# Patient Record
Sex: MALE | Race: WHITE | Employment: FULL TIME | URBAN - METROPOLITAN AREA
[De-identification: names, ages, dates, MRNs, and addresses within clinical notes are randomized per-mention and may not be internally consistent; named-entity substitution may affect disease eponyms.]

---

## 2019-05-08 NOTE — PROGRESS NOTES
PT aware of NPO status. NPO after MN night prior to procedure  PT aware of need to hold anticoagulants per protocol. Pt aware to withhold meloxicam x 10 days prior to procedure. PT aware of potential for sedation administration and need for  at discharge. PT aware of arrival time pre procedure. Arrive at [facility] radiology waiting room on 5/21/19 at 0700 for scheduled procedure to occur at 0830. Pt states no questions at this time. Gave pt [facility] radiology rn phone number [phone].

## 2019-05-21 ENCOUNTER — HOSPITAL ENCOUNTER (OUTPATIENT)
Dept: ULTRASOUND IMAGING | Age: 49
Discharge: HOME | End: 2019-05-21
Attending: RADIOLOGY | Admitting: RADIOLOGY
Payer: COMMERCIAL

## 2019-05-21 VITALS
DIASTOLIC BLOOD PRESSURE: 74 MMHG | HEART RATE: 71 BPM | BODY MASS INDEX: 27.5 KG/M2 | OXYGEN SATURATION: 95 % | TEMPERATURE: 97.8 F | SYSTOLIC BLOOD PRESSURE: 115 MMHG | RESPIRATION RATE: 16 BRPM | WEIGHT: 203 LBS | HEIGHT: 72 IN

## 2019-05-21 DIAGNOSIS — R74.02 NONSPECIFIC ELEVATION OF LEVELS OF TRANSAMINASE OR LACTIC ACID DEHYDROGENASE (LDH): ICD-10-CM

## 2019-05-21 DIAGNOSIS — K70.0 ALCOHOLIC FATTY LIVER: ICD-10-CM

## 2019-05-21 DIAGNOSIS — R74.01 NONSPECIFIC ELEVATION OF LEVELS OF TRANSAMINASE OR LACTIC ACID DEHYDROGENASE (LDH): ICD-10-CM

## 2019-05-21 DIAGNOSIS — E83.119 HEMOCHROMATOSIS: ICD-10-CM

## 2019-05-21 LAB
ANION GAP SERPL CALC-SCNC: 11 MMOL/L (ref 3–18)
APTT PPP: 29.7 SEC (ref 23–36.4)
BASOPHILS # BLD: 0.1 K/UL (ref 0–0.1)
BASOPHILS NFR BLD: 1 % (ref 0–2)
BUN SERPL-MCNC: 14 MG/DL (ref 7–18)
BUN/CREAT SERPL: 15 (ref 12–20)
CALCIUM SERPL-MCNC: 9.2 MG/DL (ref 8.5–10.1)
CHLORIDE SERPL-SCNC: 101 MMOL/L (ref 100–108)
CO2 SERPL-SCNC: 26 MMOL/L (ref 21–32)
CREAT SERPL-MCNC: 0.95 MG/DL (ref 0.6–1.3)
DIFFERENTIAL METHOD BLD: ABNORMAL
EOSINOPHIL # BLD: 0.3 K/UL (ref 0–0.4)
EOSINOPHIL NFR BLD: 3 % (ref 0–5)
ERYTHROCYTE [DISTWIDTH] IN BLOOD BY AUTOMATED COUNT: 12.4 % (ref 11.6–14.5)
GLUCOSE SERPL-MCNC: 102 MG/DL (ref 74–99)
HCT VFR BLD AUTO: 50.1 % (ref 36–48)
HGB BLD-MCNC: 17.7 G/DL (ref 13–16)
INR PPP: 1 (ref 0.8–1.2)
LYMPHOCYTES # BLD: 2.5 K/UL (ref 0.9–3.6)
LYMPHOCYTES NFR BLD: 26 % (ref 21–52)
MCH RBC QN AUTO: 34.8 PG (ref 24–34)
MCHC RBC AUTO-ENTMCNC: 35.3 G/DL (ref 31–37)
MCV RBC AUTO: 98.4 FL (ref 74–97)
MONOCYTES # BLD: 1.3 K/UL (ref 0.05–1.2)
MONOCYTES NFR BLD: 13 % (ref 3–10)
NEUTS SEG # BLD: 5.6 K/UL (ref 1.8–8)
NEUTS SEG NFR BLD: 57 % (ref 40–73)
PLATELET # BLD AUTO: 271 K/UL (ref 135–420)
PMV BLD AUTO: 9.3 FL (ref 9.2–11.8)
POTASSIUM SERPL-SCNC: 4.3 MMOL/L (ref 3.5–5.5)
PROTHROMBIN TIME: 12.6 SEC (ref 11.5–15.2)
RBC # BLD AUTO: 5.09 M/UL (ref 4.7–5.5)
SODIUM SERPL-SCNC: 138 MMOL/L (ref 136–145)
WBC # BLD AUTO: 9.8 K/UL (ref 4.6–13.2)

## 2019-05-21 PROCEDURE — 85610 PROTHROMBIN TIME: CPT

## 2019-05-21 PROCEDURE — 88313 SPECIAL STAINS GROUP 2: CPT

## 2019-05-21 PROCEDURE — 85025 COMPLETE CBC W/AUTO DIFF WBC: CPT

## 2019-05-21 PROCEDURE — 47000 NEEDLE BIOPSY OF LIVER PERQ: CPT

## 2019-05-21 PROCEDURE — 85730 THROMBOPLASTIN TIME PARTIAL: CPT

## 2019-05-21 PROCEDURE — 74011250636 HC RX REV CODE- 250/636

## 2019-05-21 PROCEDURE — 80048 BASIC METABOLIC PNL TOTAL CA: CPT

## 2019-05-21 PROCEDURE — 88307 TISSUE EXAM BY PATHOLOGIST: CPT

## 2019-05-21 PROCEDURE — 74011250636 HC RX REV CODE- 250/636: Performed by: RADIOLOGY

## 2019-05-21 RX ORDER — FLUMAZENIL 0.1 MG/ML
0.2 INJECTION INTRAVENOUS
Status: DISCONTINUED | OUTPATIENT
Start: 2019-05-21 | End: 2019-05-21 | Stop reason: HOSPADM

## 2019-05-21 RX ORDER — MIDAZOLAM HYDROCHLORIDE 1 MG/ML
.5-4 INJECTION, SOLUTION INTRAMUSCULAR; INTRAVENOUS
Status: DISCONTINUED | OUTPATIENT
Start: 2019-05-21 | End: 2019-05-21 | Stop reason: HOSPADM

## 2019-05-21 RX ORDER — LIDOCAINE HYDROCHLORIDE 10 MG/ML
10 INJECTION, SOLUTION EPIDURAL; INFILTRATION; INTRACAUDAL; PERINEURAL
Status: COMPLETED | OUTPATIENT
Start: 2019-05-21 | End: 2019-05-21

## 2019-05-21 RX ORDER — SODIUM CHLORIDE 9 MG/ML
25 INJECTION, SOLUTION INTRAVENOUS CONTINUOUS
Status: DISCONTINUED | OUTPATIENT
Start: 2019-05-21 | End: 2019-05-21 | Stop reason: HOSPADM

## 2019-05-21 RX ORDER — FENTANYL CITRATE 50 UG/ML
INJECTION, SOLUTION INTRAMUSCULAR; INTRAVENOUS
Status: COMPLETED
Start: 2019-05-21 | End: 2019-05-21

## 2019-05-21 RX ORDER — LIDOCAINE HYDROCHLORIDE 10 MG/ML
INJECTION, SOLUTION EPIDURAL; INFILTRATION; INTRACAUDAL; PERINEURAL
Status: COMPLETED
Start: 2019-05-21 | End: 2019-05-21

## 2019-05-21 RX ORDER — NALOXONE HYDROCHLORIDE 0.4 MG/ML
0.4 INJECTION, SOLUTION INTRAMUSCULAR; INTRAVENOUS; SUBCUTANEOUS AS NEEDED
Status: DISCONTINUED | OUTPATIENT
Start: 2019-05-21 | End: 2019-05-21 | Stop reason: HOSPADM

## 2019-05-21 RX ORDER — MIDAZOLAM HYDROCHLORIDE 1 MG/ML
INJECTION, SOLUTION INTRAMUSCULAR; INTRAVENOUS
Status: COMPLETED
Start: 2019-05-21 | End: 2019-05-21

## 2019-05-21 RX ORDER — FENTANYL CITRATE 50 UG/ML
25-200 INJECTION, SOLUTION INTRAMUSCULAR; INTRAVENOUS
Status: DISCONTINUED | OUTPATIENT
Start: 2019-05-21 | End: 2019-05-21 | Stop reason: HOSPADM

## 2019-05-21 RX ADMIN — FENTANYL CITRATE 25 MCG: 50 INJECTION, SOLUTION INTRAMUSCULAR; INTRAVENOUS at 08:56

## 2019-05-21 RX ADMIN — SODIUM CHLORIDE 25 ML/HR: 900 INJECTION, SOLUTION INTRAVENOUS at 07:17

## 2019-05-21 RX ADMIN — MIDAZOLAM HYDROCHLORIDE 1 MG: 1 INJECTION, SOLUTION INTRAMUSCULAR; INTRAVENOUS at 08:50

## 2019-05-21 RX ADMIN — FENTANYL CITRATE 25 MCG: 50 INJECTION, SOLUTION INTRAMUSCULAR; INTRAVENOUS at 08:53

## 2019-05-21 RX ADMIN — FENTANYL CITRATE 50 MCG: 50 INJECTION, SOLUTION INTRAMUSCULAR; INTRAVENOUS at 08:50

## 2019-05-21 RX ADMIN — LIDOCAINE HYDROCHLORIDE 4 ML: 10 INJECTION, SOLUTION EPIDURAL; INFILTRATION; INTRACAUDAL; PERINEURAL at 09:02

## 2019-05-21 RX ADMIN — MIDAZOLAM HYDROCHLORIDE 0.5 MG: 1 INJECTION, SOLUTION INTRAMUSCULAR; INTRAVENOUS at 08:53

## 2019-05-21 RX ADMIN — MIDAZOLAM HYDROCHLORIDE 0.5 MG: 1 INJECTION, SOLUTION INTRAMUSCULAR; INTRAVENOUS at 08:56

## 2019-05-21 NOTE — PROGRESS NOTES
Back from procedure. Laying on right side, tegaderm & gauze to right side of abdomen. No bleeding or swelling noted. denies pain, drink given. 1000 Sleeping,  1130 Tolerated diet well,.  8198 Ambulated to bathroom voided. Discharge instructions reviewed. Verbalized understanding. 1330 Discharged home via w/c in care of friend in stable condition. Denies pain, dressing intact to right abdomen.

## 2019-05-21 NOTE — PROCEDURES
Vascular & Interventional Radiology Brief Procedure Note    Interventional Radiologist: Richa Garrido MD    Pre-operative Diagnosis:  Liver dz    Post-operative Diagnosis: Same as pre-op dx    Procedure(s) Performed:  Liver bx    Anesthesia:  Local and Moderate Sedation    Findings:  Uncomplicated R hepatic lobe bx    Complications: None    Estimated Blood Loss:  minimal    Tubes and Drains: None    Specimens: 3, 18g cores    Condition: Good        Richa Garrido MD  [address] [address]  Vascular & Interventional Radiology  5/21/2019

## 2019-05-21 NOTE — ROUTINE PROCESS
[address] May 21, 2019       RE: Eliezer Gamez      To Whom It May Concern,    This is to certify that Eliezer Gamez may  return to work in 2 days.  Please excuse him from work due to procedure on May 21, 2019        Sincerely,  Sofy Goncalves RN

## 2019-05-21 NOTE — H&P
The patient is an appropriate candidate to undergo liver bx. Patient assessed immediately prior to induction. Anesthesia plan as follows: Moderate Sedation. Planned agent(s):  fentanyl and versed    ASA Score:  ASA 2 - Mild systemic disease    History and Physical update:  H&P was reviewed and the patient was examined. No changes have occurred in the patient's condition since the H&P was completed.     Jarret Mahoney MD  Vascular & Interventional Radiology  [facility]  5/21/2019

## 2019-05-21 NOTE — DISCHARGE INSTRUCTIONS
Patient Education     DISCHARGE SUMMARY from Nurse    PATIENT INSTRUCTIONS:    After general anesthesia or intravenous sedation, for 24 hours or while taking prescription Narcotics:  · Limit your activities  · Do not drive and operate hazardous machinery  · Do not make important personal or business decisions  · Do  not drink alcoholic beverages  · If you have not urinated within 8 hours after discharge, please contact your surgeon on call. Report the following to your surgeon:  · Excessive pain, swelling, redness or odor of or around the surgical area  · Temperature over 100.5  · Nausea and vomiting lasting longer than 4 hours or if unable to take medications  · Any signs of decreased circulation or nerve impairment to extremity: change in color, persistent  numbness, tingling, coldness or increase pain  · Any questions    What to do at Home:    If you experience any of the following symptoms , please follow up with Dr. Bhargav Berger. *  Please give a list of your current medications to your Primary Care Provider. *  Please update this list whenever your medications are discontinued, doses are      changed, or new medications (including over-the-counter products) are added. *  Please carry medication information at all times in case of emergency situations. These are general instructions for a healthy lifestyle:    No smoking/ No tobacco products/ Avoid exposure to second hand smoke  Surgeon General's Warning:  Quitting smoking now greatly reduces serious risk to your health.     Obesity, smoking, and sedentary lifestyle greatly increases your risk for illness    A healthy diet, regular physical exercise & weight monitoring are important for maintaining a healthy lifestyle    You may be retaining fluid if you have a history of heart failure or if you experience any of the following symptoms:  Weight gain of 3 pounds or more overnight or 5 pounds in a week, increased swelling in our hands or feet or shortness of breath while lying flat in bed. Please call your doctor as soon as you notice any of these symptoms; do not wait until your next office visit. Recognize signs and symptoms of STROKE:    F-face looks uneven    A-arms unable to move or move unevenly    S-speech slurred or non-existent    T-time-call 911 as soon as signs and symptoms begin-DO NOT go       Back to bed or wait to see if you get better-TIME IS BRAIN. Warning Signs of HEART ATTACK     Call 911 if you have these symptoms:   Chest discomfort. Most heart attacks involve discomfort in the center of the chest that lasts more than a few minutes, or that goes away and comes back. It can feel like uncomfortable pressure, squeezing, fullness, or pain.  Discomfort in other areas of the upper body. Symptoms can include pain or discomfort in one or both arms, the back, neck, jaw, or stomach.  Shortness of breath with or without chest discomfort.  Other signs may include breaking out in a cold sweat, nausea, or lightheadedness. Don't wait more than five minutes to call 911 - MINUTES MATTER! Fast action can save your life. Calling 911 is almost always the fastest way to get lifesaving treatment. Emergency Medical Services staff can begin treatment when they arrive -- up to an hour sooner than if someone gets to the hospital by car. The discharge information has been reviewed with the patient and caregiver. The patient and caregiver verbalized understanding. Discharge medications reviewed with the patient and caregiver and appropriate educational materials and side effects teaching were provided. ___________________________________________________________________________________________________________________________________     Percutaneous Liver Biopsy: What to Expect at [facility]  Percutaneous liver biopsy is a procedure to take a tiny sample (biopsy) of your liver tissue.  Percutaneous (say \"per-scott-ULICES-ray-us) means \"through the skin.\" The procedure is also called aspiration biopsy or fine-needle aspiration. The tissue sample is looked at under a microscope. Your doctor can look for infection or other liver problems. You may have some pain where the biopsy needle entered your skin (the puncture site). You may also have pain in your shoulder. This is called referred pain. It is caused by pain traveling along a nerve near the biopsy site. The referred pain usually lasts less than 12 hours. You may have a small amount of bleeding from the puncture site. You will need to take it easy at home for 1 or 2 days after the procedure. You will probably be able to return to work and most of your usual activities after that. This care sheet gives you a general idea about how long it will take for you to recover. But each person recovers at a different pace. Follow the steps below to get better as quickly as possible. How can you care for yourself at home? Activity    · Rest when you feel tired. Getting enough sleep will help you recover.     · Try to walk each day. Start by walking a little more than you did the day before. Bit by bit, increase the amount you walk. Walking boosts blood flow and helps prevent pneumonia and constipation.     · Avoid exercises that use your belly muscles and strenuous activities, such as bicycle riding, jogging, weight lifting, or aerobic exercise, for 1 week or until your doctor says it is okay.     · Ask your doctor when you can drive again.     · You will probably need to take 1 or 2 days off from work. It depends on the type of work you do and how you feel.     · You will probably be able to shower the same day as the test, if your doctor says it is okay. Pat the puncture site dry. Do not take a bath for at least 2 days after the test, or until your doctor tells you it is okay. Diet    · You can eat your normal diet.  If your stomach is upset, try bland, low-fat foods like plain rice, broiled chicken, toast, and yogurt.     · Drink plenty of fluids (unless your doctor tells you not to). Medicines    · Your doctor will tell you if and when you can restart your medicines. He or she will also give you instructions about taking any new medicines.     · If you take blood thinners, such as warfarin (Coumadin), clopidogrel (Plavix), or aspirin, be sure to talk to your doctor. He or she will tell you if and when to start taking those medicines again. Make sure that you understand exactly what your doctor wants you to do.     · Be safe with medicines. Take pain medicines exactly as directed. ? If the doctor gave you a prescription medicine for pain, take it as prescribed. ? If you are not taking a prescription pain medicine, take an over-the-counter medicine that your doctor recommends. Read and follow all instructions on the label. ? Do not take aspirin, ibuprofen (Advil, Motrin), naproxen (Aleve), or other nonsteroidal anti-inflammatory drugs (NSAIDs) unless your doctor says it is okay.     · If you think your pain medicine is making you sick to your stomach:  ? Take your medicine after meals (unless your doctor has told you not to). ? Ask your doctor for a different kind of pain medicine.    Care of the puncture site    · Keep a bandage over the puncture site for the first 1 or 2 days. Follow-up care is a key part of your treatment and safety. Be sure to make and go to all appointments, and call your doctor if you are having problems. It's also a good idea to know your test results and keep a list of the medicines you take. When should you call for help? Call 911 anytime you think you may need emergency care.  For example, call if:    · You passed out (lost consciousness).     · You have severe trouble breathing.     · You have sudden chest pain and shortness of breath, or you cough up blood.     · You have severe pain in your chest, shoulder, or belly.    Call your doctor now or seek immediate medical care if:    · You have new or worse shortness of breath.     · Bright red blood has soaked through the bandage over the puncture site.     · You have pain that does not get better after you take your pain medicine.     · You are sick to your stomach or cannot keep fluids down.     · You have a fever, chills, or body aches.     · You have signs of infection, such as:  ? Increased pain, swelling, warmth, or redness. ? Red streaks leading from the puncture site. ? Pus draining from the puncture site. ? A fever.     · You have new or worse pain at the puncture site.     · You have new or worse belly swelling or bloating.     · You have trouble passing urine or stool.     · Your stools are black and tarlike or have streaks of blood.     · You have pale-colored stools along with dark urine and itching.    Watch closely for changes in your health, and be sure to contact your doctor if you have any problems. Where can you learn more? Go to http://saul-rafael.info/. Enter S210 in the search box to learn more about \"Percutaneous Liver Biopsy: What to Expect at Home. \"  Current as of: June 25, 2018  Content Version: 11.9  © 1978-3194 [employer]. Care instructions adapted under license by [employer] (which disclaims liability or warranty for this information). If you have questions about a medical condition or this instruction, always ask your healthcare professional. [address] [address] any warranty or liability for your use of this information.   Patient armband removed and shredded

## 2019-05-21 NOTE — PROGRESS NOTES
TRANSFER - OUT REPORT:    Verbal report given to Idalmis Stewart RN(name) on Augusto Arce  being transferred to Care Unit(unit) for routine post - op       Report consisted of patients Situation, Background, Assessment and   Recommendations(SBAR). Information from the following report(s) SBAR, Kardex and MAR was reviewed with the receiving nurse. Lines:   Peripheral IV 05/21/19 Posterior;Right Forearm (Active)        Opportunity for questions and clarification was provided.       Patient transported with:   Registered Nurse

## 2019-07-12 ENCOUNTER — HOSPITAL ENCOUNTER (OUTPATIENT)
Age: 49
Setting detail: OUTPATIENT SURGERY
Discharge: HOME | End: 2019-07-12
Attending: INTERNAL MEDICINE | Admitting: INTERNAL MEDICINE
Payer: COMMERCIAL

## 2019-07-12 VITALS
OXYGEN SATURATION: 95 % | BODY MASS INDEX: 26.95 KG/M2 | WEIGHT: 199 LBS | RESPIRATION RATE: 15 BRPM | SYSTOLIC BLOOD PRESSURE: 124 MMHG | TEMPERATURE: 97.8 F | DIASTOLIC BLOOD PRESSURE: 88 MMHG | HEIGHT: 72 IN | HEART RATE: 66 BPM

## 2019-07-12 PROCEDURE — 77030032490 HC SLV COMPR SCD KNE COVD -B: Performed by: INTERNAL MEDICINE

## 2019-07-12 PROCEDURE — 77030020256 HC SOL INJ NACL 0.9%  500ML: Performed by: INTERNAL MEDICINE

## 2019-07-12 PROCEDURE — 99153 MOD SED SAME PHYS/QHP EA: CPT | Performed by: INTERNAL MEDICINE

## 2019-07-12 PROCEDURE — G0500 MOD SEDAT ENDO SERVICE >5YRS: HCPCS | Performed by: INTERNAL MEDICINE

## 2019-07-12 PROCEDURE — 74011250636 HC RX REV CODE- 250/636: Performed by: INTERNAL MEDICINE

## 2019-07-12 PROCEDURE — 76040000008: Performed by: INTERNAL MEDICINE

## 2019-07-12 PROCEDURE — 74011250636 HC RX REV CODE- 250/636

## 2019-07-12 RX ORDER — DIPHENHYDRAMINE HYDROCHLORIDE 50 MG/ML
50 INJECTION, SOLUTION INTRAMUSCULAR; INTRAVENOUS ONCE
Status: CANCELLED | OUTPATIENT
Start: 2019-07-12 | End: 2019-07-12

## 2019-07-12 RX ORDER — SODIUM CHLORIDE 0.9 % (FLUSH) 0.9 %
5-40 SYRINGE (ML) INJECTION AS NEEDED
Status: CANCELLED | OUTPATIENT
Start: 2019-07-12

## 2019-07-12 RX ORDER — EPINEPHRINE 0.1 MG/ML
1 INJECTION INTRACARDIAC; INTRAVENOUS
Status: CANCELLED | OUTPATIENT
Start: 2019-07-12 | End: 2019-07-13

## 2019-07-12 RX ORDER — MIDAZOLAM HYDROCHLORIDE 1 MG/ML
.25-5 INJECTION, SOLUTION INTRAMUSCULAR; INTRAVENOUS
Status: DISCONTINUED | OUTPATIENT
Start: 2019-07-12 | End: 2019-07-12 | Stop reason: HOSPADM

## 2019-07-12 RX ORDER — SODIUM CHLORIDE 0.9 % (FLUSH) 0.9 %
5-40 SYRINGE (ML) INJECTION EVERY 8 HOURS
Status: CANCELLED | OUTPATIENT
Start: 2019-07-12

## 2019-07-12 RX ORDER — FLUMAZENIL 0.1 MG/ML
0.2 INJECTION INTRAVENOUS
Status: DISCONTINUED | OUTPATIENT
Start: 2019-07-12 | End: 2019-07-12 | Stop reason: HOSPADM

## 2019-07-12 RX ORDER — SODIUM CHLORIDE 9 MG/ML
125 INJECTION, SOLUTION INTRAVENOUS CONTINUOUS
Status: DISCONTINUED | OUTPATIENT
Start: 2019-07-12 | End: 2019-07-12 | Stop reason: HOSPADM

## 2019-07-12 RX ORDER — DEXTROMETHORPHAN/PSEUDOEPHED 2.5-7.5/.8
1.2 DROPS ORAL
Status: CANCELLED | OUTPATIENT
Start: 2019-07-12

## 2019-07-12 RX ORDER — SODIUM CHLORIDE 9 MG/ML
1000 INJECTION, SOLUTION INTRAVENOUS CONTINUOUS
Status: DISCONTINUED | OUTPATIENT
Start: 2019-07-12 | End: 2019-07-12 | Stop reason: HOSPADM

## 2019-07-12 RX ORDER — ATROPINE SULFATE 0.1 MG/ML
0.5 INJECTION INTRAVENOUS
Status: CANCELLED | OUTPATIENT
Start: 2019-07-12 | End: 2019-07-13

## 2019-07-12 RX ORDER — FENTANYL CITRATE 50 UG/ML
100 INJECTION, SOLUTION INTRAMUSCULAR; INTRAVENOUS
Status: DISCONTINUED | OUTPATIENT
Start: 2019-07-12 | End: 2019-07-12 | Stop reason: HOSPADM

## 2019-07-12 RX ORDER — NALOXONE HYDROCHLORIDE 0.4 MG/ML
0.4 INJECTION, SOLUTION INTRAMUSCULAR; INTRAVENOUS; SUBCUTANEOUS
Status: DISCONTINUED | OUTPATIENT
Start: 2019-07-12 | End: 2019-07-12 | Stop reason: HOSPADM

## 2019-07-12 RX ADMIN — SODIUM CHLORIDE 125 ML/HR: 900 INJECTION, SOLUTION INTRAVENOUS at 11:06

## 2019-07-12 NOTE — PROCEDURES
[facility]  Colonoscopy Procedure Report  _______________________________________________________  Patient: Erika Smith                                         Attending Physician: Jenna Cisneros MD    Patient ID: [id number]                                      Referring Physician: Destinee Bledsoe MD    Exam Date: July 12, 2019 _______________________________________________________      Introduction: A  47 y.o. male patient, presents for outpatient Colonoscopy    Indications:   51year old male pt of Dr. Calixto Crews referred for elevated liver enzymes. As of 03/22/2019 Serum Ferritin 2579, , , ALT, 108, Bilirubin 1.8. Abd U/S on 03/28/2019 revealed mild hepatomegaly with increased coarsened hepatic parenchyma echogenicity consistent with hepatic parenchymal disease. 30 year history of ETOH, 3-6 beverages daily. He denied any iron supplement. No known family history of hemochromatosis or colon cancer. He has one bm daily. Liver was palpated approx 2cm below the rib cage and soft. 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 Ruling out clinical hemochromatosis. Investigation was negative for common liver diseases. The Liver bx revealed:   Large droplet-type macrovesicular steatosis involving approximately 80% of hepatocytes. There is marked lobular associated inflammatory infiltrate. There is focal bile stasis. There is marked periportal chronic inflammatory infiltrate with ductular proliferation and fibrous expansion there are occasional balloon cells and cells with Leana's hyaline. Iron stain is 2+ positive in Kupffer cells, macrophages, and hepatocytes. Trichrome stain shows extensive pericellular fibrosis, periportal fibrosis, fibrous septae and presence of focal bridging. Morphologic features could be consistent with alcoholic steatohepatitis, DUGAN, or combination of the two. I cannot completely exclude drug effect contribution.  Findings are suggestive of some early nodule formation, cannot completely exclude early or incomplete cirrhosis. The iron staining pattern appears consistent with the clinical history of H863D carrier state, and this may be complicating the steatohepatitis. Clinical correlation is required. Consent: The benefits, risks, and alternatives to the procedure were discussed and informed consent was obtained from the patient. Preparation: EKG, pulse, pulse oximetry and blood pressure were monitored throughout the procedure. ASA Classification: Class 1 - . The heart is an S1-S2 and regular heart rate and rhythm. Lungs are clear to auscultation and percussion. Abdomen is soft, nondistended, and nontender. Mental Status: awake, alert, and oriented to person, place, and time    Medications:  · Fentanyl 100 mcg IV before procedure. · Versed 5 mg IV throughout the procedure. Rectal Exam: Extensive perianal eczema with dry skin otherwise ormal Rectal Exam. No Blood. Prostate not enlarged    Pathology Specimens: No specimens removed. Procedure: The colonoscope was passed with ease through the anus under direct visualization and advanced to the cecum and 5 cm inside the terminal ileum. The patient required positioning on the back to aid in the passage of the scope. The scope was withdrawn and the mucosa was carefully examined. The quality of the preparation was excellent. The views were excellent. The patient's toleration of the procedure was excellent. The exam was done twice to the cecum. Total time is 17 minutes and withdrawal time is 12 minutes. Findings:    Rectum:   Tiny internal hemorrhoids  Sigmoid:   Slightly tortuous sigmoid colon. Descending Colon:   Normal  Transverse Colon:   Normal  Ascending Colon:   2 small diverticula in the proximal ascending colon. Cecum:   Normal  Terminal Ileum:   Normal  Unplanned Events: There were no unplanned events.     Estimated Blood Loss: None  Impressions:    Tiny internal hemorrhoids, Slightly tortuous sigmoid colon. 2 small diverticula in the proximal ascending colon. Normal Mucosa. No polyps found. Complications: None; patient tolerated the procedure well. Recommendations:  · Discharge home when standard parameters are met. · Resume a healthy high fiber diet low in iron and abstain completely and permanently from all alcoholic beverages. · Colonoscopy recommendation in 10 years.     Procedure Codes:    · COLONOSCOPY [[id number] (Type: Custom)]    Endoscope Information:  Model Number(s)    [id number]   Assistant: None  Signed By: Isaiah Guadarrama MD Date: July 12, 2019

## 2019-07-12 NOTE — H&P
This 51year old male presents for Abnormal liver chemistry. History of Present Illness:  1. Abnormal liver chemistry           PROBLEM LIST:   Problem List reviewed. Problem Description Onset Date Chronic Clinical Status Notes   Fatty liver 2019 Y     Raised levels of transaminase and lactic acid dehydrogenase 2019 Y     ETOH abuse 2019 Y     Serum ferritin high 2019 Y     Latent hemochromatosis 2019 N               PAST MEDICAL/SURGICAL HISTORY   (Detailed)          Family History  (Detailed)  Relationship Family Member Name  Age at Death Condition Onset Age Cause of Death       No family history of Diabetes mellitus  N       No family history of Renal disease  N       No family history of Stroke  N       No family history of Depression  N       No family history of Anxiety  N       No family history of Cancer, colon  N       No family history of Coronary artery disease  N       No family history of Cancer, breast  N       No family history of Liver disease  N       No family history of Asthma  N       No family history of High cholesterol  N       No family history of Hypertension  N   Father    Alzheimer's disease  N   Father    Cancer, prostate 67 Y         Social History:  (Detailed)  Tobacco use reviewed. The patient is right-handed. Preferred language is [place]. Born in [facility]. EDUCATION/EMPLOYMENT/OCCUPATION  The patient has a(n) high school education. Employment History Status Retired Restrictions    [employer]        MARITAL STATUS/FAMILY/SOCIAL SUPPORT  Currently . CHILDREN  Does not have children. Patient lives alone. Tobacco use status: Ex-cigarette smoker. Smoking status: Former smoker.     SMOKING STATUS  Use Status Type Smoking Status Usage Per Day Years Used Total Pack Years   yes Cigarette Former smoker        CESSATION  Type Date Quit Longest Tobacco Free Cessation Method   Cigarette 2009 ALCOHOL  There is a history of alcohol use. 3-6 drinks/day  CAFFEINE  The patient uses caffeine: coffee - 12-16 oz./day a day. LIFESTYLE  Exercise includes walking.  EXPERIENCE  Patient has [employer] experience and currently is active duty. Served in the [facility] for 5 years with an honorable discharge. Medications (active prior to today)  Medication Instructions Start Date Stop Date Refilled Elsewhere   Mobic 15 mg tablet take 1 tablet by oral route  every day 04/22/2019   N       Medications (Added, Continued or Stopped today)  Start Date Medication Directions PRN Status PRN Reason Instruction Stop Date   05/02/2019 GaviLyte-N 420 gram oral solution take as prescribed by physician N      04/22/2019 Mobic 15 mg tablet take 1 tablet by oral route  every day N        Allergies:  Ingredient Reaction (Severity) Medication Name Comment   PENICILLIN \"joint swelling\" (mild)         ORDERS:  Status Lab Order Time Frame Comments   ordered GASTROENTEROLOGY PROCEDURE within 1 Month at location [address] surgeon scheduled is Kenzie Andrade MD. An assistant has not been requested. gavilyte. ordered Amylase, Serum     ordered Actin (Smooth Muscle) Antibody & Mitochondrial (M2) Antibody     ordered Alpha-1-Antitrypsin, Serum     ordered Iron and TIBC     ordered Transferrin     ordered Hepatitis Panel (4)     ordered Hered. Hemochromatosis, DNA     ordered Lipase, Serum     Review of Systems  System Neg/Pos Details   Constitutional Negative Chills, Fever, Malaise and Weight loss. ENMT Negative Nasal congestion and Sore throat. Eyes Negative Double vision. Respiratory Negative Asthma, Chronic cough and Wheezing. Cardio Negative Chest pain, Edema and Irregular heartbeat/palpitations. GI Positive Dysphagia.    GI Negative Abdominal pain, Change in bowel habits, Constipation, Decreased appetite, Diarrhea, Heartburn, Hematemesis, Hematochezia, Melena, Nausea, Reflux and Vomiting.  Negative Dysuria and Hematuria. Endocrine Negative Cold intolerance, Heat intolerance and Increased thirst.   Neuro Negative Dizziness, Headache, Numbness, Tremors and Vertigo. Psych Negative Anxiety, Depression and Increased stress. Integumentary Negative Hives and Rash. MS Negative Back pain, Joint pain and Myalgia. Hema/Lymph Positive Easy bruising. Hema/Lymph Negative Easy bleeding. Allergic/Immuno Negative Contact allergy, Food allergies and Seasonal allergies. Vital Signs     Height  Time ft in cm Last Measured Height Position   9:07 AM 6.0 0.00 182.88 05/02/2019 0     Date/Time Temp Pulse BP MAP (Calculated) Arterial Line 1 BP (mmHg) BP Patient Position Resp SpO2 O2 Device O2 Flow Rate (L/min) Pre/Post Ductal Weight   07/12/19 1031 98.1 °F (36.7 °C) 77 146/95Abnormal                 PHYSICAL EXAM:  Exam Findings Details   Constitutional Normal Well developed. Eyes Normal Conjunctiva - Right: Normal, Left: Normal. Sclera - Right: Normal, Left: Normal.   Nasopharynx Normal Lips/teeth/gums - Normal. Buccal mucosa - Normal.   Neck Exam Normal Inspection - Normal. Palpation - Normal. Thyroid gland - Normal.   Respiratory Normal Inspection - Normal. Auscultation - Normal.   Cardiovascular Normal Regular rate and rhythm. No murmurs, gallops, or rubs. Vascular Normal Pulses - Radial: Normal, Brachial: Normal, Dorsalis pedis: Normal, Posterior tibial: Normal.   Abdomen * Hepatic enlargement - Down 2cm. Abdomen Normal Patient is not obese. Inspection - Normal. Appliance(s) - None. Abdominal muscles - Normal. Auscultation - Normal. Percussion - Normal. Anterior palpation - No guarding. CVA tenderness - None. Umbilicus - Normal. No abdominal tenderness. No spleen enlargement. No hernia. No ascites. Hill's sign - Normal. No hepatic tenderness. No hepatic bruit.    Skin Normal Inspection - Normal.   Musculoskeletal Normal Hands/Wrist - Right: Normal, Left: Normal.   Extremity Normal No edema. Neurological Normal Fine motor skills - Normal.   Psychiatric Normal Orientation - Oriented to time, place, person & situation. Appropriate mood and affect. Assessment/Plan  # Detail Type Description    1. Assessment Hemochromatosis (E83.119). Patient Plan 51year old male pt of Dr. Kevin Khoury referred for elevated liver enzymes. As of 03/22/2019 Serum Ferritin 2579, , , ALT, 108, Bilirubin 1.8. Abd U/S on 03/28/2019 revealed mild thyromegaly with increased coarsened hepatic parenchyma echogenicity consistent with hepatic parenchymal disease. Pt reports 30 year history of ETOH consumption. Current use is 3-6 alcoholic beverages daily. Denies any tobacco or herbal use. No abdominal or chest pain, nausea, vomiting, diarrhea or constipation. No history of pancreatitis, cardiac or celiac, or Diabetes. Denies any iron supplement or seafood. No known family history of hemochromatosis. Pt has been advised to stop all alcohol use immediately. Liver palpated on assessment approx 2cm below line and soft. PLAN: Actin & Mitochondrial, A1-antitripsin, Amylase, Lipase, Hepatitis Panel, transferrin, Hered Hemochromatosis, Iron and TIBC. Avoid ETOH , iron, supplement, Vit C, and red meat. Liver bx to be scheduled. Correction will involved blood withdrawal through phlebotomy. Plan Orders Actin (Smooth Muscle) Antibody & Mitochondrial (M2) Antibody to be performed, Alpha-1-Antitrypsin, Serum to be performed, Amylase, Serum to be performed, Hepatitis Panel (4) to be performed, Hered. Hemochromatosis, DNA to be performed, Iron and TIBC to be performed, Lipase, Serum to be performed and Transferrin to be performed. 2. Assessment Encounter for screening colonoscopy (Z12.11). Patient Plan 51year old male patient of Dr. Kevin Khoury  for colonoscopy screening. BM once daily. Normal color, soft, formed in consistency.  No evidence of blood or mucus, changes in bowel pattern or constipation issues. Patient reports no allergies or herbal consumption. Medical hx includes joint pain in bilateral feet. No significant cardiac, pulmonary, GI, , musculoskeletal, or endocrine issues. Surgical hx unremarkable. No family history of colorectal CA. Denies tobacco and reports chronic ETOH use. No significant weight gains or losses in the last 3-6 months. No heat or cold intolerances. Patient states  no N/V/D, fever, chills, sick contacts, SOB, abdominal or chest pains. No dysphagia, appetite is good which consists of 3 meals per day. PLAN: Colonoscopy Scheduled     He has average risk for colon cancer and asymptomatic. He would be having his screening colonoscopy. I explained to him the procedure of colonoscopy and the risks involved which include but not limited to reaction to sedation, bleeding, perforation, infection or missing a lesion if bowels are not well prepped or are unusually tortuous. He agreed to proceed with the procedure and answered his questions. thoroughly. I gave him the Gavilyte to clean his bowels. I advised him to take if needed, extra laxatives for few days before in the event he is on the constipated side to assure adequate bowel prep. Told him not take his medications in the morning of the procedure because they would be flushed out with the prep but he can take them more confidently after the procedure. I advised him to bring all his medication with him.      No change in H&P

## 2019-07-12 NOTE — DISCHARGE INSTRUCTIONS
Geovanni Calloway  [id number]  1970    COLON DISCHARGE INSTRUCTIONS    Discomfort:  Redness at IV site- apply warm compress to area; if redness or soreness persist- contact your physician  There may be a slight amount of blood passed from the rectum  Gaseous discomfort- walking, belching will help relieve any discomfort  You may not operate a vehicle til the next day. You may not engage in an occupation involving machinery or appliances til the next day. You may not drink alcoholic beverages til the next day. DIET:   High fiber diet. ACTIVITY:  You may not  resume your normal daily activities til the next day. it is recommended that you spend the remainder of the day resting -  avoid any strenuous activity. CALL M.D.  IF ANY SIGN OF:   Increasing pain, nausea, vomiting  Abdominal distension (swelling)  New increased bleeding (oral or rectal)  Fever (chills)  Pain in chest area  Bloody discharge from nose or mouth  Shortness of breath    You may  take any Advil, Aspirin, Ibuprofen, Motrin, Aleve, or Goodys but preferably  Tylenol as needed for pain. Post procedure diagnosis:  HEMORRHOIDS; Follow-up Instructions: Your follow up colonoscopy will be in 10 years. Tatianna Mcknight MD  July 12, 2019   Patient armband removed and shredded  DISCHARGE SUMMARY from Nurse    PATIENT INSTRUCTIONS:    After general anesthesia or intravenous sedation, for 24 hours or while taking prescription Narcotics:  · Limit your activities  · Do not drive and operate hazardous machinery  · Do not make important personal or business decisions  · Do  not drink alcoholic beverages  · If you have not urinated within 8 hours after discharge, please contact your surgeon on call.     Report the following to your surgeon:  · Excessive pain, swelling, redness or odor of or around the surgical area  · Temperature over 100.5  · Nausea and vomiting lasting longer than 4 hours or if unable to take medications  · Any signs of decreased circulation or nerve impairment to extremity: change in color, persistent  numbness, tingling, coldness or increase pain  · Any questions    What to do at Home:  Recommended activity: as above    If you experience any of the following symptoms as above, please follow up with Doctor Jo Barlow. *  Please give a list of your current medications to your Primary Care Provider. *  Please update this list whenever your medications are discontinued, doses are      changed, or new medications (including over-the-counter products) are added. *  Please carry medication information at all times in case of emergency situations. These are general instructions for a healthy lifestyle:    No smoking/ No tobacco products/ Avoid exposure to second hand smoke  Surgeon General's Warning:  Quitting smoking now greatly reduces serious risk to your health. Obesity, smoking, and sedentary lifestyle greatly increases your risk for illness    A healthy diet, regular physical exercise & weight monitoring are important for maintaining a healthy lifestyle    You may be retaining fluid if you have a history of heart failure or if you experience any of the following symptoms:  Weight gain of 3 pounds or more overnight or 5 pounds in a week, increased swelling in our hands or feet or shortness of breath while lying flat in bed. Please call your doctor as soon as you notice any of these symptoms; do not wait until your next office visit. The discharge information has been reviewed with the patient and caregiver. The patient and caregiver verbalized understanding. Discharge medications reviewed with the patient and caregiver and appropriate educational materials and side effects teaching were provided.   ___________________________________________________________________________________________________________________________________

## 2022-03-30 ENCOUNTER — HOSPITAL ENCOUNTER (OUTPATIENT)
Dept: LAB | Age: 52
Discharge: HOME | End: 2022-03-30
Payer: COMMERCIAL

## 2022-03-30 ENCOUNTER — OFFICE VISIT (OUTPATIENT)
Dept: HEMATOLOGY | Age: 52
End: 2022-03-30
Payer: COMMERCIAL

## 2022-03-30 VITALS
DIASTOLIC BLOOD PRESSURE: 69 MMHG | BODY MASS INDEX: 24.71 KG/M2 | RESPIRATION RATE: 17 BRPM | HEART RATE: 56 BPM | TEMPERATURE: 98.6 F | OXYGEN SATURATION: 98 % | SYSTOLIC BLOOD PRESSURE: 108 MMHG | WEIGHT: 182.2 LBS

## 2022-03-30 DIAGNOSIS — R74.8 ELEVATED LIVER ENZYMES: Primary | ICD-10-CM

## 2022-03-30 DIAGNOSIS — R74.8 ELEVATED LIVER ENZYMES: ICD-10-CM

## 2022-03-30 PROBLEM — K70.9 ALCOHOLIC LIVER DISEASE (HCC): Status: ACTIVE | Noted: 2022-03-30

## 2022-03-30 PROBLEM — Z14.8 HEMOCHROMATOSIS CARRIER: Status: ACTIVE | Noted: 2022-03-30

## 2022-03-30 LAB
ALBUMIN SERPL-MCNC: 3.6 G/DL (ref 3.4–5)
ALBUMIN/GLOB SERPL: 1.1 {RATIO} (ref 0.8–1.7)
ALP SERPL-CCNC: 67 U/L (ref 45–117)
ALT SERPL-CCNC: 30 U/L (ref 16–61)
ANION GAP SERPL CALC-SCNC: 3 MMOL/L (ref 3–18)
AST SERPL-CCNC: 21 U/L (ref 10–38)
BASOPHILS # BLD: 0.1 K/UL (ref 0–0.1)
BASOPHILS NFR BLD: 1 % (ref 0–2)
BILIRUB SERPL-MCNC: 0.9 MG/DL (ref 0.2–1)
BUN SERPL-MCNC: 19 MG/DL (ref 7–18)
BUN/CREAT SERPL: 21 (ref 12–20)
CALCIUM SERPL-MCNC: 8.8 MG/DL (ref 8.5–10.1)
CHLORIDE SERPL-SCNC: 106 MMOL/L (ref 100–111)
CO2 SERPL-SCNC: 30 MMOL/L (ref 21–32)
CREAT SERPL-MCNC: 0.9 MG/DL (ref 0.6–1.3)
DIFFERENTIAL METHOD BLD: ABNORMAL
EOSINOPHIL # BLD: 0.2 K/UL (ref 0–0.4)
EOSINOPHIL NFR BLD: 2 % (ref 0–5)
ERYTHROCYTE [DISTWIDTH] IN BLOOD BY AUTOMATED COUNT: 13.1 % (ref 11.6–14.5)
GLOBULIN SER CALC-MCNC: 3.2 G/DL (ref 2–4)
GLUCOSE SERPL-MCNC: 88 MG/DL (ref 74–99)
HCT VFR BLD AUTO: 43 % (ref 36–48)
HGB BLD-MCNC: 14.7 G/DL (ref 13–16)
IMM GRANULOCYTES # BLD AUTO: 0 K/UL (ref 0–0.04)
IMM GRANULOCYTES NFR BLD AUTO: 1 % (ref 0–0.5)
INR PPP: 1 (ref 0.8–1.2)
LYMPHOCYTES # BLD: 2 K/UL (ref 0.9–3.6)
LYMPHOCYTES NFR BLD: 22 % (ref 21–52)
MCH RBC QN AUTO: 33.5 PG (ref 24–34)
MCHC RBC AUTO-ENTMCNC: 34.2 G/DL (ref 31–37)
MCV RBC AUTO: 97.9 FL (ref 78–100)
MONOCYTES # BLD: 1.1 K/UL (ref 0.05–1.2)
MONOCYTES NFR BLD: 13 % (ref 3–10)
NEUTS SEG # BLD: 5.4 K/UL (ref 1.8–8)
NEUTS SEG NFR BLD: 61 % (ref 40–73)
NRBC # BLD: 0 K/UL (ref 0–0.01)
NRBC BLD-RTO: 0 PER 100 WBC
PLATELET # BLD AUTO: 285 K/UL (ref 135–420)
PMV BLD AUTO: 10.3 FL (ref 9.2–11.8)
POTASSIUM SERPL-SCNC: 4.5 MMOL/L (ref 3.5–5.5)
PROT SERPL-MCNC: 6.8 G/DL (ref 6.4–8.2)
PROTHROMBIN TIME: 12.6 SEC (ref 11.5–15.2)
RBC # BLD AUTO: 4.39 M/UL (ref 4.35–5.65)
SODIUM SERPL-SCNC: 139 MMOL/L (ref 136–145)
WBC # BLD AUTO: 8.8 K/UL (ref 4.6–13.2)

## 2022-03-30 PROCEDURE — 36415 COLL VENOUS BLD VENIPUNCTURE: CPT

## 2022-03-30 PROCEDURE — 80053 COMPREHEN METABOLIC PANEL: CPT

## 2022-03-30 PROCEDURE — 85610 PROTHROMBIN TIME: CPT

## 2022-03-30 PROCEDURE — 85025 COMPLETE CBC W/AUTO DIFF WBC: CPT

## 2022-03-30 PROCEDURE — 99204 OFFICE O/P NEW MOD 45 MIN: CPT | Performed by: INTERNAL MEDICINE

## 2022-03-30 NOTE — PROGRESS NOTES
[address] [facility], [place], [address], [place], [place]      Andreas Noun, PA-C Maury Lesch, [facility]     Nurys Aquino, [facility]   DIEGO Martins, [facility]       Estephanie Weathers Amari De Wren [address]    at [address], [facility] Aletha Herndon  22.    [phone]    FAX: [address] [place] [address]    at [address], [address]    [phone]    FAX: [phone]       Patient Care Team:  Angle Lorenzo MD as PCP - General (Family Medicine)      Problem List  Date Reviewed: 3/30/2022          Codes Class Noted    Elevated liver enzymes ICD-10-CM: R74.8  ICD-9-CM: 790.5  3/30/2022        Hemochromatosis carrier ICD-10-CM: Z14.8  ICD-9-CM: V83.89  9/65/3979        Alcoholic liver disease [facility]) ICD-10-CM: K70.9  ICD-9-CM: 571.3  3/30/2022              The clinicians listed above have asked me to see Isidro Mcintosh in consultation regarding elevated liver enzymes and its management. All medical records sent by the referring physicians were reviewed including imaging studies     The patient is a 45 y.o.  male who was found to have elevated liver transaminases in 2019. He had been consuming alcohol in excess. He has been abstinent from alcohol since 2/2022. Serologic evaluation for markers of chronic liver disease was positive for an elevation in ferritin,  FE saturation. Ultrasound of the liver at [place] [address]. The results of the imaging are not available at this time. He underwent a liver biopsy t [facility] in 5/2019. This demonstrated steatohepatitis and stage 3 bridging fibrosis. The patient does not have any symptoms which could be attributed to the liver disorder.     The patient is not experiencing the following symptoms which are commonly seen in this liver disorder:   fatigue,   pain in the right side over the liver,     The patient completes all daily activities without any functional limitations        ASSESSMENT AND PLAN:  Alcohol liver disease  The diagnosis is based upon a history of consuming alcohol in excess, liver biopsy, serology that is negative for other causes of chronic liver disease. A liver biopsy performed in 5/2019 shows steatohepatitis and stage 3 bridging fibrosis. The patient has consumed up to 1/2 gallon of alcohol daily. After the liver biopsy he cut this down to 2 drinks per day. The patient has been remain abstinent from alcohol since 2/2022. Discussed the need to remain abstinent. Have performed laboratory testing to monitor liver function and degree of liver injury. This included BMP, hepatic panel, CBC with platelet count, INR. Liver transaminases are now normal.  ALP is normal.  Liver function is normal.  The platelet count is normal.      The need to perform an assessment of liver fibrosis was discussed with the patient. The Fibroscan can assess liver fibrosis and determine if a patient has advanced fibrosis or cirrhosis without the need for liver biopsy. This will be performed at the next office visit. The Fibroscan can be repeated annually or as often as clinically indicated to assess for fibrosis progression and/or regression. Elevation in Ferritin   There is an elevation in ferritin with Normal iron saturation. HFE genetic testing was negative for any mutations. Suspect the elevation in ferritin is secondary to alcohol use and will come down to normal with abstinence. Screening for Hepatocellular Carcinoma  HCC screening is not necessary if the patient has no evidence of cirrhosis.     Treatment of other medical problems in patients with chronic liver disease  There are no contraindications for the patient to take most medications that are necessary for treatment of other medical issues. The patient has recently stopped consuming alcohol. Regular alcohol use increases the risk of toxicity from acetaminophen. This analgesic should be avoided until the patient has been abstinent from alcohol for 6 months. Vaccinations   The need for vaccination against viral hepatitis A and B will be assessed with serologic and instituted as appropriate. Routine vaccinations against other bacterial and viral agents can be performed as indicated. Annual flu vaccination should be administered if indicated. ALLERGIES  Allergies   Allergen Reactions    Pcn [Penicillins] Swelling       MEDICATIONS  Current Outpatient Medications   Medication Sig    MELOXICAM PO Take  by mouth. No current facility-administered medications for this visit. SYSTEM REVIEW NOT RELATED TO LIVER DISEASE OR REVIEWED ABOVE:  Constitution systems: Negative for fever, chills, weight gain, weight loss. Eyes: Negative for visual changes. ENT: Negative for sore throat, painful swallowing. Respiratory: Negative for cough, hemoptysis, SOB. Cardiology: Negative for chest pain, palpitations. GI:  Negative for constipation or diarrhea. : Negative for urinary frequency, dysuria, hematuria, nocturia. Skin: Negative for rash. Hematology: Negative for easy bruising, blood clots. Musculo-skelatal: Negative for back pain, muscle pain, weakness. Neurologic: Negative for headaches, dizziness, vertigo, memory problems not related to HE. Psychology: Negative for anxiety, depression. FAMILY HISTORY:  The father  of cancer. The mother Has/had the following chronic disease(s): unknown. There is no family history of liver disease. SOCIAL HISTORY:  The patient is . The patient has no children. The patient stopped using tobacco products in . The patient had been consuming 1/2 gallon of alcohol per day   He initially cut this down to 2 drinks per day.     The patient has been abstinent from alcohol since 2/2022. The patient currently works full time as . PHYSICAL EXAMINATION:  Visit Vitals  /69 (BP 1 Location: Right arm, BP Patient Position: Sitting)   Pulse (!) 56   Temp 98.6 °F (37 °C)   Resp 17   Wt 182 lb 3.2 oz (82.6 kg)   SpO2 98%   BMI 24.71 kg/m²     General: No acute distress. Eyes: Sclera anicteric. ENT: No oral lesions. Thyroid normal.  Nodes: No adenopathy. Skin: No spider angiomata. No jaundice. No palmar erythema. Respiratory: Lungs clear to auscultation. Cardiovascular: Regular heart rate. No murmurs. No JVD. Abdomen: Soft non-tender. Liver size normal to percussion/palpation. Spleen not palpable. No obvious ascites. Extremities: No edema. No muscle wasting. No gross arthritic changes. Neurologic: Alert and oriented. Cranial nerves grossly intact. No asterixis. LABORATORY STUDIES:  From 1/2020  AST/ALT/ALP/T Bili/ALB:  116/110/76/1.9/4.6  WBC/HB/PLT/INR:  6.5/15.5/216  NA/BUN/CREAT:  15/0.8    Liver [place] of [address] Units 3/30/2022   WBC 4.6 - 13.2 K/uL 8.8   ANC 1.8 - 8.0 K/UL 5.4   HGB 13.0 - 16.0 g/dL 14.7    - 420 K/uL 285   INR 0.8 - 1.2   1.0   AST 10 - 38 U/L 21   ALT 16 - 61 U/L 30   Alk Phos 45 - 117 U/L 67   Bili, Total 0.2 - 1.0 MG/DL 0.9   Albumin 3.4 - 5.0 g/dL 3.6   BUN 7.0 - 18 MG/DL 19 (H)   Creat 0.6 - 1.3 MG/DL 0.90   Na 136 - 145 mmol/L 139   K 3.5 - 5.5 mmol/L 4.5   Cl 100 - 111 mmol/L 106   CO2 21 - 32 mmol/L 30   Glucose 74 - 99 mg/dL 88     SEROLOGIES:  1/2020.   HBsAntigen negative, anti-HCV negative, Ferritin 1263, iron saturation 40%, HFE genetic testing no mutations, ASMA negative, AMA negative, ceruloplsmin 20,     LIVER HISTOLOGY:  Not available or performed    ENDOSCOPIC PROCEDURES:  Not available or performed    RADIOLOGY:  Not available or performed    OTHER TESTING:  Not available or performed    FOLLOW-UP:  All of the issues listed above in the Assessment and Plan were discussed with the patient. All questions were answered. The patient expressed a clear understanding of the above. [address] [place] [address] in 3 months for Fibroscan to review all data and determine the treatment plan.       Nova Velazquez MD  [address] [address], [address] [place] [address], [address]  [address] [place] [address]

## 2022-03-30 NOTE — Clinical Note
4/24/2022    Patient: Ofelia Barrera   YOB: 1970   Date of Visit: 3/30/2022     Nancy Castro MD  [address] [address]  Via Fax: [phone]    Dear Nancy Castro MD,      Thank you for referring Mr. Ofelia Barrera to [address] [place] [address],[address] for evaluation. My notes for this consultation are attached. If you have questions, please do not hesitate to call me. I look forward to following your patient along with you.       Sincerely,    Peggy Epps MD

## 2022-04-24 PROBLEM — F10.11 ALCOHOL ABUSE, IN REMISSION: Status: ACTIVE | Noted: 2022-04-24

## 2022-04-24 PROBLEM — R74.8 ELEVATED LIVER ENZYMES: Status: RESOLVED | Noted: 2022-03-30 | Resolved: 2022-04-24

## 2022-04-24 PROBLEM — R79.89 ELEVATED FERRITIN: Status: ACTIVE | Noted: 2022-04-24

## 2022-06-28 ENCOUNTER — HOSPITAL ENCOUNTER (OUTPATIENT)
Dept: LAB | Age: 52
Discharge: HOME | End: 2022-06-28
Payer: COMMERCIAL

## 2022-06-28 ENCOUNTER — OFFICE VISIT (OUTPATIENT)
Dept: HEMATOLOGY | Age: 52
End: 2022-06-28
Payer: COMMERCIAL

## 2022-06-28 VITALS
HEIGHT: 72 IN | WEIGHT: 171 LBS | SYSTOLIC BLOOD PRESSURE: 130 MMHG | TEMPERATURE: 97.5 F | DIASTOLIC BLOOD PRESSURE: 87 MMHG | BODY MASS INDEX: 23.16 KG/M2

## 2022-06-28 DIAGNOSIS — R74.8 ELEVATED LIVER ENZYMES: ICD-10-CM

## 2022-06-28 DIAGNOSIS — R74.8 ELEVATED LIVER ENZYMES: Primary | ICD-10-CM

## 2022-06-28 DIAGNOSIS — B18.2 CHRONIC HEPATITIS C WITHOUT HEPATIC COMA (HCC): ICD-10-CM

## 2022-06-28 LAB
ALBUMIN SERPL-MCNC: 3.9 G/DL (ref 3.4–5)
ALBUMIN/GLOB SERPL: 1.3 {RATIO} (ref 0.8–1.7)
ALP SERPL-CCNC: 74 U/L (ref 45–117)
ALT SERPL-CCNC: 42 U/L (ref 16–61)
ANION GAP SERPL CALC-SCNC: 6 MMOL/L (ref 3–18)
AST SERPL-CCNC: 34 U/L (ref 10–38)
BASOPHILS # BLD: 0.1 K/UL (ref 0–0.1)
BASOPHILS NFR BLD: 1 % (ref 0–2)
BILIRUB DIRECT SERPL-MCNC: 0.5 MG/DL (ref 0–0.2)
BILIRUB SERPL-MCNC: 1.8 MG/DL (ref 0.2–1)
BUN SERPL-MCNC: 15 MG/DL (ref 7–18)
BUN/CREAT SERPL: 17 (ref 12–20)
CALCIUM SERPL-MCNC: 9.3 MG/DL (ref 8.5–10.1)
CHLORIDE SERPL-SCNC: 103 MMOL/L (ref 100–111)
CO2 SERPL-SCNC: 29 MMOL/L (ref 21–32)
CREAT SERPL-MCNC: 0.87 MG/DL (ref 0.6–1.3)
DIFFERENTIAL METHOD BLD: ABNORMAL
EOSINOPHIL # BLD: 0.1 K/UL (ref 0–0.4)
EOSINOPHIL NFR BLD: 2 % (ref 0–5)
ERYTHROCYTE [DISTWIDTH] IN BLOOD BY AUTOMATED COUNT: 13.3 % (ref 11.6–14.5)
GLOBULIN SER CALC-MCNC: 3 G/DL (ref 2–4)
GLUCOSE SERPL-MCNC: 156 MG/DL (ref 74–99)
HCT VFR BLD AUTO: 53 % (ref 36–48)
HGB BLD-MCNC: 18.2 G/DL (ref 13–16)
IMM GRANULOCYTES # BLD AUTO: 0 K/UL (ref 0–0.04)
IMM GRANULOCYTES NFR BLD AUTO: 0 % (ref 0–0.5)
INR PPP: 1 (ref 0.8–1.2)
LYMPHOCYTES # BLD: 1.3 K/UL (ref 0.9–3.6)
LYMPHOCYTES NFR BLD: 18 % (ref 21–52)
MCH RBC QN AUTO: 32.7 PG (ref 24–34)
MCHC RBC AUTO-ENTMCNC: 34.3 G/DL (ref 31–37)
MCV RBC AUTO: 95.3 FL (ref 78–100)
MONOCYTES # BLD: 0.8 K/UL (ref 0.05–1.2)
MONOCYTES NFR BLD: 11 % (ref 3–10)
NEUTS SEG # BLD: 5 K/UL (ref 1.8–8)
NEUTS SEG NFR BLD: 69 % (ref 40–73)
NRBC # BLD: 0 K/UL (ref 0–0.01)
NRBC BLD-RTO: 0 PER 100 WBC
PLATELET # BLD AUTO: 251 K/UL (ref 135–420)
PMV BLD AUTO: 10.4 FL (ref 9.2–11.8)
POTASSIUM SERPL-SCNC: 4.1 MMOL/L (ref 3.5–5.5)
PROT SERPL-MCNC: 6.9 G/DL (ref 6.4–8.2)
PROTHROMBIN TIME: 13.1 SEC (ref 11.5–15.2)
RBC # BLD AUTO: 5.56 M/UL (ref 4.35–5.65)
SODIUM SERPL-SCNC: 138 MMOL/L (ref 136–145)
WBC # BLD AUTO: 7.4 K/UL (ref 4.6–13.2)

## 2022-06-28 PROCEDURE — 86706 HEP B SURFACE ANTIBODY: CPT

## 2022-06-28 PROCEDURE — 85025 COMPLETE CBC W/AUTO DIFF WBC: CPT

## 2022-06-28 PROCEDURE — 80048 BASIC METABOLIC PNL TOTAL CA: CPT

## 2022-06-28 PROCEDURE — 36415 COLL VENOUS BLD VENIPUNCTURE: CPT

## 2022-06-28 PROCEDURE — 85610 PROTHROMBIN TIME: CPT

## 2022-06-28 PROCEDURE — 87522 HEPATITIS C REVRS TRNSCRPJ: CPT

## 2022-06-28 PROCEDURE — 86708 HEPATITIS A ANTIBODY: CPT

## 2022-06-28 PROCEDURE — 82107 ALPHA-FETOPROTEIN L3: CPT

## 2022-06-28 PROCEDURE — 99214 OFFICE O/P EST MOD 30 MIN: CPT | Performed by: NURSE PRACTITIONER

## 2022-06-28 PROCEDURE — 86704 HEP B CORE ANTIBODY TOTAL: CPT

## 2022-06-28 PROCEDURE — 80076 HEPATIC FUNCTION PANEL: CPT

## 2022-06-28 RX ORDER — BUPROPION HYDROCHLORIDE 75 MG/1
TABLET ORAL DAILY
COMMUNITY

## 2022-06-28 NOTE — PROGRESS NOTES
[facility], [place], [address], [place], [place]      JULIUS Turcios, [facility]     Nurys Aquino, [facility]   DIEGO Adams, [facility]       Estephanie Weathers Amari De Wren 136    at [address], [address] Aletha Herndon  22.    [phone]    FAX: [address] [place] [address]    at [address], [address]    [phone]    FAX: [phone]       Patient Care Team:  Manjeet Mcdowell MD as PCP - General (Family Medicine)      Problem List  Date Reviewed: 4/24/2022          Codes Class Noted    Alcohol abuse, in remission ICD-10-CM: F10.11  ICD-9-CM: 305.03  4/24/2022        Elevated ferritin ICD-10-CM: R79.89  ICD-9-CM: 790.6  4/24/2022        Hemochromatosis carrier ICD-10-CM: Z14.8  ICD-9-CM: V83.89  7/47/5292        [facility] ([facility] ICD-10-CM: K70.9  ICD-9-CM: 571.3  3/30/2022              Allen Juarez returns to the [facility] today for education and management of elevated liver enzymes. He was supposed to have a fibroscan during this appointment, but the procedure was postponed because he recently ate. The active problem list, all pertinent past medical history, medications, liver histology, endoscopic studies, radiologic findings and laboratory findings related to the liver disorder were reviewed with the patient. The patient is a 47 y.o.  male who was found to have elevated liver transaminases in 2019. He had been consuming alcohol in excess. He had been abstinent from alcohol since 2/2022 for a few months but relapsed and is drinking again. Serologic evaluation for markers of chronic liver disease was positive for an elevation in ferritin,  FE saturation.       Ultrasound of the liver at [facility].. The results of the imaging are not available at this time. He underwent a liver biopsy at [facility] in 5/2019. This demonstrated steatohepatitis and stage 3 bridging fibrosis. The patient does not have any symptoms which could be attributed to the liver disorder. The patient completes all daily activities without any functional limitations. The patient has not experienced fatigue, fevers, chills, shortness of breath, chest pain, pain in the right side over the liver, diffuse abdominal pain, nausea, vomiting, constipation, diarrhrea, dry eyes, dry mouth, arthralgias, myalgias, yellowing of the eyes or skin, itching, dark urine, problems concentrating, swelling of the abdomen, swelling of the lower extremities, hematemesis, or hematochezia. Since the last office appointment:  \"I try to keep my drinks, if I drink at all, to two a day\". No longer drinking every day. Still drinking about 4 days a week. ASSESSMENT AND PLAN:  Alcohol liver disease  The diagnosis is based upon a history of consuming alcohol in excess, liver biopsy, serology that is negative for other causes of chronic liver disease. A liver biopsy performed in 5/2019 shows steatohepatitis and stage 3 bridging fibrosis. The patient has consumed up to 1/2 gallon of alcohol daily. After the liver biopsy he cut this down to 2 drinks per day. The patient quit all alcohol 2/2022 but is drinking again. Discussed the need to remain abstinent. Have performed laboratory testing to monitor liver function and degree of liver injury. This included BMP, hepatic panel, CBC with platelet count, INR. Liver transaminases are now normal.  ALP is normal.  INR is normal, albumin is normal, and the bilirubin is elevated. The platelet count is normal.      The need to perform an assessment of liver fibrosis was discussed with the patient.   The Fibroscan can assess liver fibrosis and determine if a patient has advanced fibrosis or cirrhosis without the need for liver biopsy. This will be performed at the next office visit in 4 months. The Fibroscan can be repeated annually or as often as clinically indicated to assess for fibrosis progression and/or regression. Elevation in Ferritin   There is an elevation in ferritin with Normal iron saturation. HFE genetic testing was negative for any mutations. Suspect the elevation in ferritin is secondary to alcohol use and will come down to normal with abstinence. Screening for Hepatocellular Carcinoma  HCC screening is not necessary if the patient has no evidence of cirrhosis. Treatment of other medical problems in patients with chronic liver disease  There are no contraindications for the patient to take most medications that are necessary for treatment of other medical issues. Vaccinations   Vaccination for viral hepatitis A and B is not required. The patient has serologic evidence of prior exposure or vaccination with immunity. Routine vaccinations against other bacterial and viral agents can be performed as indicated. Annual flu vaccination should be administered if indicated. The active problem list, all pertinent past medical history, medications, liver histology, endoscopic studies, radiologic findings and laboratory findings related to the liver disorder were reviewed with the patient. ALLERGIES  Allergies   Allergen Reactions    Pcn [Penicillins] Swelling       MEDICATIONS  Current Outpatient Medications   Medication Sig    buPROPion (WELLBUTRIN) 75 mg tablet Take  by mouth daily.  MELOXICAM PO Take  by mouth. (Patient not taking: Reported on 6/28/2022)     No current facility-administered medications for this visit. SYSTEM REVIEW NOT RELATED TO LIVER DISEASE OR REVIEWED ABOVE:  Constitution systems: Negative for fever, chills, weight gain, weight loss. Eyes: Negative for visual changes.   ENT: Negative for sore throat, painful swallowing. Respiratory: Negative for cough, hemoptysis, SOB. Cardiology: Negative for chest pain, palpitations. GI:  Negative for constipation or diarrhea. : Negative for urinary frequency, dysuria, hematuria, nocturia. Skin: Negative for rash. Hematology: Negative for easy bruising, blood clots. Musculo-skelatal: Negative for back pain, muscle pain, weakness. Neurologic: Negative for headaches, dizziness, vertigo, memory problems not related to HE. Psychology: Negative for anxiety, depression. FAMILY HISTORY:  The father  of cancer. The mother Has/had the following chronic disease(s): unknown. There is no family history of liver disease. SOCIAL HISTORY:  The patient is . The patient has no children. The patient stopped using tobacco products in . The patient had been consuming 1/2 gallon of alcohol per day   He initially cut this down to 2 drinks per day. The patient currently works full time as . PHYSICAL EXAMINATION:  Visit Vitals  /87   Temp 97.5 °F (36.4 °C)   Ht 6' (1.829 m)   Wt 171 lb (77.6 kg)   BMI 23.19 kg/m²     General: No acute distress. Eyes: Sclera anicteric. ENT: No oral lesions. Thyroid normal.  Nodes: No adenopathy. Skin: No spider angiomata. No jaundice. No palmar erythema. Respiratory: Lungs clear to auscultation. Cardiovascular: Regular heart rate. No murmurs. No JVD. Abdomen: Soft non-tender. Liver size normal to percussion/palpation. Spleen not palpable. No obvious ascites. Extremities: No edema. No muscle wasting. No gross arthritic changes. Neurologic: Alert and oriented. Cranial nerves grossly intact. No asterixis.     LABORATORY STUDIES:  [facility] [place] [facility] [address] [place] [address] Units 2022 3/30/2022   WBC 4.6 - 13.2 K/uL 7.4 8.8   ANC 1.8 - 8.0 K/UL 5.0 5.4   HGB 13.0 - 16.0 g/dL 18.2 (H) 14.7    - 420 K/uL 251 285   INR 0.8 - 1.2   1.0 1.0   AST 10 - 38 U/L 34 21 ALT 16 - 61 U/L 42 30   Alk Phos 45 - 117 U/L 74 67   Bili, Total 0.2 - 1.0 MG/DL 1.8 (H) 0.9   Bili, Direct 0.0 - 0.2 MG/DL 0.5 (H)    Albumin 3.4 - 5.0 g/dL 3.9 3.6   BUN 7.0 - 18 MG/DL 15 19 (H)   Creat 0.6 - 1.3 MG/DL 0.87 0.90   Na 136 - 145 mmol/L 138 139   K 3.5 - 5.5 mmol/L 4.1 4.5   Cl 100 - 111 mmol/L 103 106   CO2 21 - 32 mmol/L 29 30   Glucose 74 - 99 mg/dL 156 (H) 88     SEROLOGIES:  Serologies Latest Ref Rng & Units 6/28/2022   Hep A Ab, Total Negative   Positive (A)   Hep B Core Ab, Total Negative   Negative   Hep B Surface Ab >10.0 mIU/mL 31.06   Hep B Surface Ab Interp POS   Positive     1/2020. HBsAntigen negative, anti-HCV negative, Ferritin 1263, iron saturation 40%, HFE genetic testing no mutations, ASMA negative, AMA negative, ceruloplsmin 20,     LIVER HISTOLOGY:  Not available or performed    ENDOSCOPIC PROCEDURES:  Not available or performed    RADIOLOGY:  Not available or performed    OTHER TESTING:  Not available or performed    FOLLOW-UP:  All of the issues listed above in the Assessment and Plan were discussed with the patient. All questions were answered. The patient expressed a clear understanding of the above. [address] in 4 months for fibroscan.     Wilton Mcadams, RAYP-C  [facility] [place] [address], [address] [place] [address] [facility], [address] [place] [address]   [phone]

## 2022-06-29 LAB
HBV SURFACE AB SER QL IA: POSITIVE
HBV SURFACE AB SERPL IA-ACNC: 31.06 MIU/ML
HEP BS AB COMMENT,HBSAC: NORMAL

## 2022-06-30 LAB
HAV AB SER QL IA: POSITIVE
HBV CORE AB SERPL QL IA: NEGATIVE

## 2022-07-01 LAB
AFP L3 MFR SERPL: NORMAL % (ref 0–9.9)
AFP SERPL-MCNC: 4.3 NG/ML (ref 0–8.4)
HCV RNA SERPL NAA+PROBE-LOG IU: NOT DETECTED HCV LOG 10IU/ML
HCV RNA SERPL PROBE AMP-ACNC: NOT DETECTED HCVIU/ML

## 2024-05-21 ENCOUNTER — TELEPHONE (OUTPATIENT)
Age: 54
End: 2024-05-21

## 2024-08-27 ENCOUNTER — TELEPHONE (OUTPATIENT)
Age: 54
End: 2024-08-27

## (undated) DEVICE — SYR 5ML 1/5 GRAD LL NSAF LF --

## (undated) DEVICE — SOLUTION IV 500ML 0.9% SOD CHL FLX CONT

## (undated) DEVICE — KENDALL SCD EXPRESS SLEEVES, KNEE LENGTH, MEDIUM: Brand: KENDALL SCD

## (undated) DEVICE — SYRINGE 50ML E/T

## (undated) DEVICE — WRISTBAND ID AD W2.5XL9.5CM RED VYN ADH CLSR UNI-PRINT

## (undated) DEVICE — MEDI-VAC NON-CONDUCTIVE SUCTION TUBING: Brand: CARDINAL HEALTH

## (undated) DEVICE — MAJ-1414 SINGLE USE ADPATER BIOPSY VALV: Brand: SINGLE USE ADAPTOR BIOPSY VALVE

## (undated) DEVICE — NDL PRT INJ NSAF BLNT 18GX1.5 --

## (undated) DEVICE — CATH SUC CTRL PRT TRIFLO 14FR --

## (undated) DEVICE — CANNULA CUSH AD W/ 14FT TBG

## (undated) DEVICE — TRNQT TEXT 1X18IN BLU LF DISP -- CONVERT TO ITEM 362165

## (undated) DEVICE — SPONGE GZ W4XL4IN COT 12 PLY TYP VII WVN C FLD DSGN

## (undated) DEVICE — SET ADMIN 16ML TBNG L100IN 2 Y INJ SITE IV PIGGY BK DISP

## (undated) DEVICE — KENDALL RADIOLUCENT FOAM MONITORING ELECTRODE RECTANGULAR SHAPE: Brand: KENDALL

## (undated) DEVICE — ENDO CARRY-ON PROCEDURE KIT INCLUDES ENZYMATIC SPONGE, GAUZE, BIOHAZARD LABEL, TRAY, LUBRICANT, DIRTY SCOPE LABEL, WATER LABEL, TRAY, DRAWSTRING PAD, AND DEFENDO 4-PIECE KIT.: Brand: ENDO CARRY-ON PROCEDURE KIT

## (undated) DEVICE — SYR 3ML LL TIP 1/10ML GRAD --

## (undated) DEVICE — SINGLE PORT MANIFOLD: Brand: NEPTUNE 2

## (undated) DEVICE — CATH IV SAFE STR 22GX1IN BLU -- PROTECTIV PLUS

## (undated) DEVICE — NDL FLTR TIP 5 MIC 18GX1.5IN --

## (undated) DEVICE — TRAP SPEC COLL POLYP POLYSTYR --

## (undated) DEVICE — GAUZE SPNG AVANT 4X4 4PLY NS --